# Patient Record
Sex: FEMALE | Race: OTHER
[De-identification: names, ages, dates, MRNs, and addresses within clinical notes are randomized per-mention and may not be internally consistent; named-entity substitution may affect disease eponyms.]

---

## 2020-10-04 ENCOUNTER — HOSPITAL ENCOUNTER (INPATIENT)
Dept: HOSPITAL 72 - EMR | Age: 65
LOS: 2 days | Discharge: HOME | DRG: 206 | End: 2020-10-06
Payer: MEDICARE

## 2020-10-04 VITALS — WEIGHT: 139.11 LBS | BODY MASS INDEX: 25.6 KG/M2 | HEIGHT: 62 IN

## 2020-10-04 VITALS — DIASTOLIC BLOOD PRESSURE: 52 MMHG | SYSTOLIC BLOOD PRESSURE: 108 MMHG

## 2020-10-04 VITALS — SYSTOLIC BLOOD PRESSURE: 115 MMHG | DIASTOLIC BLOOD PRESSURE: 67 MMHG

## 2020-10-04 VITALS — DIASTOLIC BLOOD PRESSURE: 62 MMHG | SYSTOLIC BLOOD PRESSURE: 105 MMHG

## 2020-10-04 VITALS — DIASTOLIC BLOOD PRESSURE: 67 MMHG | SYSTOLIC BLOOD PRESSURE: 140 MMHG

## 2020-10-04 DIAGNOSIS — I10: ICD-10-CM

## 2020-10-04 DIAGNOSIS — R94.31: ICD-10-CM

## 2020-10-04 DIAGNOSIS — E78.5: ICD-10-CM

## 2020-10-04 DIAGNOSIS — I83.90: ICD-10-CM

## 2020-10-04 DIAGNOSIS — M94.0: Primary | ICD-10-CM

## 2020-10-04 LAB
ADD MANUAL DIFF: NO
ALBUMIN SERPL-MCNC: 3.9 G/DL (ref 3.4–5)
ALBUMIN/GLOB SERPL: 1.1 {RATIO} (ref 1–2.7)
ALP SERPL-CCNC: 95 U/L (ref 46–116)
ALT SERPL-CCNC: 52 U/L (ref 12–78)
ANION GAP SERPL CALC-SCNC: 5 MMOL/L (ref 5–15)
APTT BLD: 28 SEC (ref 23–33)
AST SERPL-CCNC: 35 U/L (ref 15–37)
BASOPHILS NFR BLD AUTO: 1.7 % (ref 0–2)
BILIRUB SERPL-MCNC: 0.3 MG/DL (ref 0.2–1)
BUN SERPL-MCNC: 11 MG/DL (ref 7–18)
CALCIUM SERPL-MCNC: 8.9 MG/DL (ref 8.5–10.1)
CHLORIDE SERPL-SCNC: 109 MMOL/L (ref 98–107)
CHOLEST SERPL-MCNC: 192 MG/DL (ref ?–200)
CO2 SERPL-SCNC: 29 MMOL/L (ref 21–32)
CREAT SERPL-MCNC: 0.8 MG/DL (ref 0.55–1.3)
EOSINOPHIL NFR BLD AUTO: 2.5 % (ref 0–3)
ERYTHROCYTE [DISTWIDTH] IN BLOOD BY AUTOMATED COUNT: 12.3 % (ref 11.6–14.8)
GLOBULIN SER-MCNC: 3.5 G/DL
HCT VFR BLD CALC: 41.1 % (ref 37–47)
HDLC SERPL-MCNC: 55 MG/DL (ref 40–60)
HGB BLD-MCNC: 13.9 G/DL (ref 12–16)
INR PPP: 1 (ref 0.9–1.1)
LYMPHOCYTES NFR BLD AUTO: 45 % (ref 20–45)
MCV RBC AUTO: 90 FL (ref 80–99)
MONOCYTES NFR BLD AUTO: 9.2 % (ref 1–10)
NEUTROPHILS NFR BLD AUTO: 41.8 % (ref 45–75)
PLATELET # BLD: 230 K/UL (ref 150–450)
POTASSIUM SERPL-SCNC: 3.5 MMOL/L (ref 3.5–5.1)
RBC # BLD AUTO: 4.57 M/UL (ref 4.2–5.4)
SODIUM SERPL-SCNC: 143 MMOL/L (ref 136–145)
TRIGL SERPL-MCNC: 87 MG/DL (ref 30–150)
WBC # BLD AUTO: 7.7 K/UL (ref 4.8–10.8)

## 2020-10-04 PROCEDURE — 93017 CV STRESS TEST TRACING ONLY: CPT

## 2020-10-04 PROCEDURE — 93970 EXTREMITY STUDY: CPT

## 2020-10-04 PROCEDURE — 85730 THROMBOPLASTIN TIME PARTIAL: CPT

## 2020-10-04 PROCEDURE — 84484 ASSAY OF TROPONIN QUANT: CPT

## 2020-10-04 PROCEDURE — 93306 TTE W/DOPPLER COMPLETE: CPT

## 2020-10-04 PROCEDURE — 36415 COLL VENOUS BLD VENIPUNCTURE: CPT

## 2020-10-04 PROCEDURE — 85025 COMPLETE CBC W/AUTO DIFF WBC: CPT

## 2020-10-04 PROCEDURE — 80061 LIPID PANEL: CPT

## 2020-10-04 PROCEDURE — 85610 PROTHROMBIN TIME: CPT

## 2020-10-04 PROCEDURE — 83036 HEMOGLOBIN GLYCOSYLATED A1C: CPT

## 2020-10-04 PROCEDURE — 83880 ASSAY OF NATRIURETIC PEPTIDE: CPT

## 2020-10-04 PROCEDURE — 99285 EMERGENCY DEPT VISIT HI MDM: CPT

## 2020-10-04 PROCEDURE — 71045 X-RAY EXAM CHEST 1 VIEW: CPT

## 2020-10-04 PROCEDURE — 84443 ASSAY THYROID STIM HORMONE: CPT

## 2020-10-04 PROCEDURE — 93005 ELECTROCARDIOGRAM TRACING: CPT

## 2020-10-04 PROCEDURE — 78452 HT MUSCLE IMAGE SPECT MULT: CPT

## 2020-10-04 PROCEDURE — 80053 COMPREHEN METABOLIC PANEL: CPT

## 2020-10-04 PROCEDURE — 83735 ASSAY OF MAGNESIUM: CPT

## 2020-10-04 PROCEDURE — 85379 FIBRIN DEGRADATION QUANT: CPT

## 2020-10-04 PROCEDURE — 83690 ASSAY OF LIPASE: CPT

## 2020-10-04 RX ADMIN — LISINOPRIL SCH MG: 10 TABLET ORAL at 19:30

## 2020-10-04 NOTE — NUR
NURSE NOTES:

Patient received from SIDNEY Lundberg. Patient is awake, alert and oriented x 4. Patient is 
talkative, only speaks Japanese. Patient is in room air satting at 98%. Patient has a 20 
gauge IV on his right AC. No complaints at this moment. Bed is in the lowest position, call 
light within reach. Will continue to monitor.

## 2020-10-04 NOTE — NUR
NURSE NOTES:

Patient experienced an episode of low /52. Contacted Dr. Manuel Trejo. Instructed to 
hold lisinopril 10 mg per Dr. Manuel Trejo.

## 2020-10-04 NOTE — NUR
NURSE NOTES:

Pt arrived in the unit. Received report from VANESSA Cuba RN. Pt alert, A/O x4, Scottish 
speaking but understands English, able to make needs known. Pt has steady gait. Pt 
complaining of 8/10 pain on L chest that radiates to L shoulder, nitro patch still intact 
from ED. VS obtained and WNL (/67, RR 16, O2 sat in RA 98%, HR 60). Admission 
assessment performed, skin intact. IV site patent and asymptomatic. Bed on lowest position, 
call light within reach. Med brought by pt, will bring to pharmacy. Pt refused to re-count 
her money and refused to keep in safe. Will contact MD for admission orders.

## 2020-10-04 NOTE — EMERGENCY ROOM REPORT
History of Present Illness


General


Chief Complaint:  Chest Pain


Source:  Patient





Present Illness


HPI


65-year-old female with past medical history of hypertension, dyslipidemia 

presents with substernal left-sided chest pressure that has been constant since 

yesterday.  She called her primary care doctor Dr. Wagner who advised her to come 

to the ER for further evaluation.


She denies history of angiogram, echocardiogram or recent cardiac evaluation.  

She did not take aspirin prior to arrival.  She denies any shortness of breath, 

dyspnea on exertion, peripheral edema, cough, fever, hemoptysis, nausea, 

vomiting, diarrhea, hematuria, back pain or other symptoms.


The patient's symptoms were gradual onset, severity was moderate, duration since

1 day.  


Quality: Pressure





Past medical history: hypertension, dyslipidemia


Past surgical history: Hysterectomy





Smoking:  Denies


Alcohol use:  Denies


Drug use:  Denies





Review of systems:


CONST: No fevers or chills, No night sweats


PULMONARY: No productive cough,  No shortness of breath 


CARDIAC: No chest pain, No palpitations 


GI: No vomiting, No diarrhea , No melena_or_BRBPR 


: No dysuria, No hematuria, No discharge 


NEURO: No new_focal_weakness_or_numbness, No confusion, No vision changes


14 point Review of Systems is otherwise negative except per HPI





Physical Exam:


GENERAL: Awake_alert_ nontoxic, no acute distress Spo2 100% on RA -normal


EYES: Extraocular muscles are intact. Conjunctivae clear. Lids without swelling


ENT: External nose and ear normal_in_appearance. Oropharynx clear. 

Head_atraumatic, Moist_oral_mucosa


NECK:  No JVD. No meningismus. No thyromegaly.  Supple. Trachea midline


RESP: Normal respiratory effort. Symmetric rise. No stridor. 

Clear_to_auscultation_No_rales_No_wheezes


CARDIAC: Regular rate and regular rhytm. No_significant pedal edema.


ABDOMEN: Soft. Nondistended.  Nontender_No_rebound_or_guarding.


MSK:  Normal muscle tone, without rigidity.  Extremities without asymmetric 

deformity or swelling.  


SKIN: Warm and dry.  No visible cyanosis or pallor


NEUROLOGIC: Alert, oriented x3.  Motor_and_sensation_grossly_intact. No truncal 

ataxia. Gait_normal


Psych: Normal mood and affect, normal judgment and insight











- COORDINATION OF CARE


Case was discussed with: Patient  , Patient's Physician





Any labs and imaging that were ordered were interpreted as part of the medical 

decision making:








Medical Decision Making/Plan:





Differential diagnosis includes acute myocardial infarction, acute coronary 

syndrome and unstable angina, pulmonary embolism, pneumothorax, pneumonia, and 

aortic dissection, among others.  





Patient is currently well appearing with stable vitals.   


EKG shows biphasic T wave inversions in lead V2, V3, and T wave inversions in 

the lateral leads.  No STEMI.


Chest xray shows cardiomegaly with no pleural effusions or interstitial edema.  

No evidence of decompensated CHF. No evidence of pneumothorax, pneumonia, or 

significant pleural effusion.


Troponin is negative x1.





Aspirin given.  However, given that chest pain is currently resolved, risks 

likely outweigh benefits of IV heparin at this time, so deferred.





The pain is not classic for pericarditis or myocarditis, and the patient has no 

significant risk factors for a pericardial effusion and has stable vitals signs,

unlikely to have tamponade.  Pain is not likely to be  pulmonary embolism, 

patient has  no significant PE risk factors.   The presentation is not 

consistent with dissection, pain is not severe, radiating to back, or tearing in

nature.  Has normal bilateral radial and pedal pulses.   However given patients

presentation and risk factors, patient will be admitted for serial troponins and

risk stratification and evaluation for likely stress testing.





This patient appears to be a suitable candidate for transfer to telemetry floor 

at this time with orders from the admitting physician who is aware of the 

patient's evaluation, ancillary test findings, and current condition, and agrees

with treatment and disposition.  





I spoke with Dr. Urias, and reviewed the patients presentation, workup, 

results, and treatment.  


They will admit the patient for further care and evaluation, and assume care of 

the patient at this time.


Allergies:  


Coded Allergies:  


     No Known Allergies (Unverified , 4/15/14)





COVID-19 Screening


Contact w/high risk pt:  No


Experienced COVID-19 symptoms?:  No


COVID-19 Testing performed PTA:  No





Nursing Documentation-[facility]


Past Medical History:  No History, Except For


Hx Hypertension:  Yes





Physical Exam





Vital Signs








  Date Time  Temp Pulse Resp B/P (MAP) Pulse Ox O2 Delivery O2 Flow Rate FiO2


 


10/4/20 14:50 98.4 66 18 152/76 (101) 97 Room Air  








Sp02 EP Interpretation:  reviewed, normal





Medical Decision Making


Diagnostic Impression:  


   Primary Impression:  


   Chest pain


   Additional Impressions:  


   Abnormal EKG


   HTN (hypertension)


   HLD (hyperlipidemia)





EKG Diagnostic Results


PA Scribe Jenni


12-lead EKG (interpreted by me) 


Time: 1454


Indication: Rhythm analysis


Tracing visualized and Interpreted by me. Rhythm:  Normal sinus rhythm 


Rate:  63 bpm


QTc:  425


Morphology: No_significant_ST_elevations_or_depressions, No STEMI


Impression: Biphasic T wave inversions in lead V2 and V3.  T wave inversions in 

lateral leads.  No acute STEMI





Rhythm Strip Diag. Results


Rhythm Strip Time:  15:18


EP Interpretation:  yes


Rate:  65


Rhythm:  NSR, no PVC's, no ectopy





Chest X-Ray Diagnostic Results


Chest X-Ray Diagnostic Results :  


   LETI Ronibe Jenni


Chest X-Ray: 


Views: 1 view(s)


Indication:  Chest pain


Findings: Normal heart size.  Mediastinum normal.  No infiltrate.


Impression: No acute disease


The X-ray(s) were independently viewed and interpreted contemporaneously


Electronically signed by Marlen tadeo DO


Reevaluation Time:  16:16





Last Vital Signs








  Date Time  Temp Pulse Resp B/P (MAP) Pulse Ox O2 Delivery O2 Flow Rate FiO2


 


10/4/20 15:13    152/76    


 


10/4/20 14:50 98.4 66 18  97 Room Air  








Status:  improved


Disposition:  ADMITTED AS INPATIENT


Admit Decision Time:  15:18


Condition:  Stable











Marlen Franklin D.O.            Oct 4, 2020 15:18

## 2020-10-04 NOTE — NUR
NURSE HAND-OFF REPORT: 



Important Events on Shift: New admit

Patient Status: 

Diet: 



Pending Orders: 

Pending Results/Labs:

Pending MD notification:



Latest Vital Signs: Temperature 96.4 , Pulse 60 , B/P 115 /67 , Respiratory Rate 16 , O2 SAT 
98 , Room Air, O2 Flow Rate .  

Vital Sign Comment: 



EKG Rhythm: Sinus Rhythm

Rhythm change?: 

MD Notified?: -

MD Response: 



Latest Suh Fall Score: 20  

Fall Risk: Low Risk 

Safety Measures: Call light Within Reach, Bed Alarm Zone 1, Side Rails Side Rails x2, Bed 
position Low and Locked.

Fall Precautions: 

Patient Fall Education



Report given to SIDNEY Arriaga

## 2020-10-04 NOTE — NUR
ED Nurse Note:



Patient is resting on gurney with no distress at this time. Calm and speaks in 
full sentences.

## 2020-10-04 NOTE — DIAGNOSTIC IMAGING REPORT
FILM CXR 1 VIEW

 

INDICATION: Pain

 

COMPARISON: April 15, 2014

 

FINDINGS: Single frontal view demonstrates prominent heart size and 

pulmonary vascular congestion.  The lungs are clear.  No pleural 

effusions.  The visualized osseous structures are within normal limits.

 

IMPRESSION: Enlarged heart and mild pulmonary congestion without focal 

pneumonia.

## 2020-10-05 VITALS — DIASTOLIC BLOOD PRESSURE: 70 MMHG | SYSTOLIC BLOOD PRESSURE: 112 MMHG

## 2020-10-05 VITALS — SYSTOLIC BLOOD PRESSURE: 126 MMHG | DIASTOLIC BLOOD PRESSURE: 70 MMHG

## 2020-10-05 VITALS — SYSTOLIC BLOOD PRESSURE: 110 MMHG | DIASTOLIC BLOOD PRESSURE: 60 MMHG

## 2020-10-05 VITALS — DIASTOLIC BLOOD PRESSURE: 71 MMHG | SYSTOLIC BLOOD PRESSURE: 136 MMHG

## 2020-10-05 VITALS — DIASTOLIC BLOOD PRESSURE: 57 MMHG | SYSTOLIC BLOOD PRESSURE: 106 MMHG

## 2020-10-05 RX ADMIN — NITROGLYCERIN PRN MG: 0.4 TABLET SUBLINGUAL at 08:14

## 2020-10-05 RX ADMIN — NITROGLYCERIN PRN MG: 0.4 TABLET SUBLINGUAL at 08:08

## 2020-10-05 RX ADMIN — LISINOPRIL SCH MG: 10 TABLET ORAL at 09:00

## 2020-10-05 RX ADMIN — Medication SCH MG: at 09:22

## 2020-10-05 NOTE — CARDIOLOGY PROGRESS NOTE
Assessment/Plan


Assessment/Plan


ms pain reproducible on palpation 


abn ekg 


htn 


hyperlipidmia 








all trop are neg and pain reproduced by palptation 


however ekg abn , echo normal wall motion 





[id number]





Objective





Last 24 Hour Vital Signs








  Date Time  Temp Pulse Resp B/P (MAP) Pulse Ox O2 Delivery O2 Flow Rate FiO2


 


10/5/20 16:00 98.6 66 16 110/60 (77) 97   


 


10/5/20 16:00  57      


 


10/5/20 12:00  60      


 


10/5/20 12:00 98.1 57 16 110/60 (77) 97   


 


10/5/20 09:00      Room Air  


 


10/5/20 09:00    115/62    


 


10/5/20 08:14    126/70    


 


10/5/20 08:08    126/70    


 


10/5/20 08:00  73      


 


10/5/20 08:00 98.4 66 17 126/70 (88) 96   


 


10/5/20 04:00  60      


 


10/5/20 04:00 97.9 62 16 136/71 (92) 98   


 


10/5/20 00:05 97.9 63 16 106/57 (73) 97   


 


10/5/20 00:00  56      


 


10/4/20 21:00      Room Air  

















Intake and Output  


 


 10/4/20 10/5/20





 19:00 07:00


 


Intake Total 0 ml 350 ml


 


Balance 0 ml 350 ml


 


  


 


Intake Oral 0 ml 350 ml


 


# Voids  3











Laboratory Tests








Test


 10/5/20


02:53 10/5/20


09:35 10/5/20


18:00


 


Hemoglobin A1c


 6.1 %


(4.3-6.0)  H 


 





 


Magnesium Level


 2.4 MG/DL


(1.8-2.4) 


 





 


Troponin I


 0.000 ng/mL


(0.000-0.056) 0.000 ng/mL


(0.000-0.056) 0.000 ng/mL


(0.000-0.056)


 


Thyroid Stimulating Hormone


(TSH) 9.974 uiU/mL


(0.358-3.740) 


 




















Devin Nguyen MD           Oct 5, 2020 20:10

## 2020-10-05 NOTE — NUR
NURSE NOTES:

At 8:10 pt complained of 8/10 chest pain radiating to left should. /70. Gave nitro SL 
.4mg. after 5 minutes pain at 7/10 same radiation pattern. Gave second dose. BP dropped to 
102/62. o2 at 2 liters NC applied. spoke to Dr. Trejo who said to contact Dr. Nguyen. 
Per Dr. Wilder do Stat EKG. After reviewing ekg results he ordered venous duplex and 2d 
echo. All orders place. Current pain is 3/10 in the chest with radiation to left shoulder. 
States that she experienced epigastric pain as well a few moments ago but now it is gone.

## 2020-10-05 NOTE — NUR
NURSE NOTES:

Reported to Dr. Nguyen the results od 2d echo, negative venous duplex, chest pain of 3/10 
radiating to left shoulder. Waiting for response

## 2020-10-05 NOTE — NUR
NURSE NOTES:

Received report from SIDNEY Abbott pt in bed, awake, alert, oriented X4. Able to make needs known. 
No resp distress noted. Cardiac monitor in place. Right AC 20G saline locked, no s/s 
infiltration noted. Amb with slow steady gait. Bed in low position & locked, side rails up 
x2. Call light with in reach. bed alarm on. Explained to pt to use call light when 
assistance is needed.

## 2020-10-05 NOTE — HISTORY & PHYSICAL
History and Physical


History & Physicial


HP dictated # [id number]











Manuel Trejo MD                Oct 5, 2020 09:48

## 2020-10-05 NOTE — NUR
NURSE NOTES:

Pt in bed eating breakfast. No complaint of pain or distress. bed low and locked. call light 
within reach. Whiteboard updated.

-------------------------------------------------------------------------------

Addendum: 10/05/20 at 0729 by Milena Mcgraw RN

-------------------------------------------------------------------------------

Pt received from SIDNEY Arriaga.

## 2020-10-05 NOTE — HISTORY AND PHYSICAL REPORT
DATE OF ADMISSION:  10/04/2020

CHIEF COMPLAINT:  Chest pain.



HISTORY OF PRESENT ILLNESS:  This is a 65-year-old  female who

presented to the emergency room with chest pressure which started the day

prior to admission.  The patient now states that when she takes a deep

breath, the pain is worse.  The patient was admitted for further

evaluation and treatment.



PAST MEDICAL HISTORY:  The patient denies history of diabetes, although it

is mentioned in the ER note, she does have history of hypertension and

hyperlipidemia.



PAST SURGICAL HISTORY:  Hysterectomy.



MEDICATIONS:  Reviewed in the EMR.



SOCIAL HISTORY:  No history of smoking or alcohol abuse.  The patient lives

with her children.



ALLERGIES:  No known drug allergies.



REVIEW OF SYSTEMS:  Noncontributory except above.



PHYSICAL EXAMINATION:

GENERAL:  The patient is a 65-year-old female in no acute distress.

 

VITAL SIGNS:  Blood pressure 115/62, pulse 66, temperature 98.7,

respiratory rate 17.

HEENT:  Pink conjunctivae.  Anicteric sclerae.

NECK:  Supple.

LUNGS:  Clear to auscultation.

CHEST:  The patient has some tenderness.

HEART:  S1, S2 without murmurs or rubs.

ABDOMEN:  Soft, nontender.

EXTREMITIES:  No cyanosis or edema.



LABORATORY FINDINGS:  CBC shows a WBC of 7700, hematocrit 41.1, hemoglobin

is 13.9, platelets 230,000.  Chemistry panel shows a serum sodium 143,

potassium 3.5, chloride 109, BUN is 11, creatinine 0.8.  TSH is 9.9.  EKG

could not be found in the chart today; however, the tracing does not show

any acute changes.



ASSESSMENT:  This is a 65-year-old female who was admitted with chest pain,

although the patient says it is pressure like; however, now she said that

it is worse with respirations so it appears that it is pleuritic chest

pain and atypical.  Her risk factors are hypertension and

hyperlipidemia.



PLAN:  The patient will have troponins checked to rule out myocardial

infarction.  Cardiology consultation will be obtained.  The patient will

likely need a stress test prior to discharge after she is ruled out.

Lipid panel will be checked.  Medication will be adjusted.  TSH is

somewhat on the high side; however, she is on levothyroxine, which we will

continue and I will not increase the dose at this time.  The patient was

started on aspirin.









  ______________________________________________

  Manuel Trejo M.D.





DR:  Nate

D:  10/05/2020 09:50

T:  10/05/2020 10:27

JOB#:  [id number]

CC:

## 2020-10-05 NOTE — DIAGNOSTIC IMAGING REPORT
EXAM: ULTRASOUND Venous Duplex Scan Nick Leg

 

CLINICAL HISTORY: Leg pain and edema.

 

COMPARISON:  None

 

TECHNIQUE:  Doppler examination include grayscale images obtained with and without

compression, and color and spectral doppler analysis.

 

FINDINGS:  

 

Doppler examination shows normal spontaneity, phasicity, compressibility in the

bilateral lower extremities. There is no thrombus identified by grayscale. Normal

color and spectral flow is identified. There is no evidence of valvular incompetency

or insufficiency.

 

 

IMPRESSION:

 

UNREMARKABLE VENOUS DUPLEX.

## 2020-10-05 NOTE — NUR
NURSE HAND-OFF REPORT: 



Important Events on Shift:[chest pain not completeley relived by niotro. Nitro dropped her 
bp. stat ekg, troponin, and venous duplex done]

Patient Status: [in bed alert and talkative.]

Diet: [no salt added]



Pending Orders: [na]

Pending Results/Labs:[na]

Pending MD notification:[na]



Latest Vital Signs: Temperature 98.6 , Pulse 57 , B/P 110 /60 , Respiratory Rate 16 , O2 SAT 
97 , Room Air, O2 Flow Rate .  

Vital Sign Comment: [stable]



EKG Rhythm: Sinus Bradycardia

Rhythm change?: Y 

MD Notified?: N -

MD Response: 



Latest Suh Fall Score: 20  

Fall Risk: Low Risk 

Safety Measures: Call light Within Reach, Bed Alarm Zone 1, Side Rails Side Rails x2, Bed 
position Low and Locked.

Fall Precautions: 

Patient Fall Education



Report given to [Coreen LITTLE].

## 2020-10-05 NOTE — NUR
CASE MANAGEMENT: REVIEW



65 YEAR OLD FEMALE PRESENTED TO ED FROM HOME 



CC: CHEST PAIN 







SI: ACS . HTN

T 98.4 HR 53 RR 14 /76 SAT 97% ROOM AIR

LDL CHOLESTEROL 119 TROP I 0.008 







IS: ASA 325MG PO X1

NITROGLYCERIN 1 INCH TOPICAL X1 



CARDIO CONSULTED

2D ECHO PENDING 

VENOUS DUPLEX SCAN BLE PENDING 





***TELEMETRY UNIT STATUS***

DCP: PATIENT IS FROM HOME

## 2020-10-05 NOTE — NUR
NURSE HAND-OFF REPORT: 



Important Events on Shift:[Hold lisinopril for SBP less than 120 per Dr. Manuel Trejo]

Patient Status: []

Diet: [No added salt diet]



Pending Orders: []

Pending Results/Labs:[]

Pending MD notification:[]



Latest Vital Signs: Temperature 97.9 , Pulse 60 , B/P 136 /71 , Respiratory Rate 16 , O2 SAT 
98 , Room Air, O2 Flow Rate .  

Vital Sign Comment: []



EKG Rhythm: Sinus Rhythm

Rhythm change?: Y 

MD Notified?: N -

MD Response: 



Latest Suh Fall Score: 20  

Fall Risk: Low Risk 

Safety Measures: Call light Within Reach, Bed Alarm Zone 1, Side Rails Side Rails x2, Bed 
position Low and Locked.

Fall Precautions: 

Patient Fall Education



Report given to [SIDNEY Abbott].

## 2020-10-06 VITALS — DIASTOLIC BLOOD PRESSURE: 72 MMHG | SYSTOLIC BLOOD PRESSURE: 132 MMHG

## 2020-10-06 VITALS — DIASTOLIC BLOOD PRESSURE: 76 MMHG | SYSTOLIC BLOOD PRESSURE: 150 MMHG

## 2020-10-06 VITALS — DIASTOLIC BLOOD PRESSURE: 67 MMHG | SYSTOLIC BLOOD PRESSURE: 131 MMHG

## 2020-10-06 VITALS — DIASTOLIC BLOOD PRESSURE: 72 MMHG | SYSTOLIC BLOOD PRESSURE: 143 MMHG

## 2020-10-06 VITALS — DIASTOLIC BLOOD PRESSURE: 75 MMHG | SYSTOLIC BLOOD PRESSURE: 116 MMHG

## 2020-10-06 VITALS — DIASTOLIC BLOOD PRESSURE: 71 MMHG | SYSTOLIC BLOOD PRESSURE: 137 MMHG

## 2020-10-06 RX ADMIN — Medication SCH MG: at 09:28

## 2020-10-06 RX ADMIN — LISINOPRIL SCH MG: 10 TABLET ORAL at 16:47

## 2020-10-06 NOTE — NUR
NURSE NOTES:

Pt discharged home.. Wrist band removed. Iv removed. Paperwork signed, belongings signed 
for. Medication picked up from pharmacy and signed for. Tele monitor removed. Pt stable and 
understands that she must call Dr. Gissell stoddard for appointment on Monday the 12th. 
Medication reviewed.

## 2020-10-06 NOTE — NUR
NURSE NOTES:

Pt received from Coreen LITTLE. pt in bed awake and alert no complaint of chest pain at this 
time. SHe is NPO and understadn she will be having a lexiscan later today. Bed low not 
locked as she is ambulatory and stable. call light within reach, she complain of some pain 
in her right big toe where nail is digging into skin. Will ask MD for wound consult.

## 2020-10-06 NOTE — NUR
NURSE NOTES: PATIENT DISCHARGED HOME VIA FAMILY VEHICLE, IV DISCONTINUE BY AM NURSE. NO 
SIGNS AND SYMPTOMS OF ACUTE DISTRESS. VITALS STABLE, AFEBRILE.

## 2020-10-06 NOTE — GENERAL PROGRESS NOTE
Subjective


Allergies:  


Coded Allergies:  


     No Known Allergies (Unverified , 4/15/14)


Subjective


still with CP





Objective





Last 24 Hour Vital Signs








  Date Time  Temp Pulse Resp B/P (MAP) Pulse Ox O2 Delivery O2 Flow Rate FiO2


 


10/6/20 12:00  53      


 


10/6/20 12:00 99.0 55 20 150/76 (100) 99   


 


10/6/20 09:00      Room Air  


 


10/6/20 08:00  54      


 


10/6/20 08:00 97.5 54 18 137/71 (93) 99   


 


10/6/20 04:00 97.9 62 20 132/72 (92) 99   


 


10/6/20 04:00  53      


 


10/6/20 00:00 98.7 74 18 116/75 (89) 97   


 


10/6/20 00:00  56      


 


10/5/20 21:00      Room Air  


 


10/5/20 20:00 98.0 70 18 112/70 (84) 98   


 


10/5/20 20:00  70      


 


10/5/20 16:00 98.6 66 16 110/60 (77) 97   


 


10/5/20 16:00  57      

















Intake and Output  


 


 10/5/20 10/6/20





 19:00 07:00


 


Intake Total  200 ml


 


Balance  200 ml


 


  


 


Intake Oral  200 ml


 


# Voids  3








Laboratory Tests


10/5/20 18:00: Troponin I 0.000


10/6/20 05:44: D-Dimer 0.45


Height (Feet):  5


Height (Inches):  2.00


Weight (Pounds):  141


Cardiovascular:  normal rate


Respiratory/Chest:  lungs clear


Edema:  no edema noted Generalized





Assessment/Plan


Problem List:  


(1) ACS (acute coronary syndrome)


ICD Codes:  I24.9 - Acute ischemic heart disease, unspecified


SNOMED:  296021768


(2) HTN (hypertension)


ICD Codes:  I10 - Essential (primary) hypertension


SNOMED:  57759263


(3) HLD (hyperlipidemia)


ICD Codes:  E78.5 - Hyperlipidemia, unspecified


SNOMED:  06854475


(4) Abnormal EKG


ICD Codes:  R94.31 - Abnormal electrocardiogram [ECG] [EKG]


SNOMED:  896791820


(5) Chest pain


ICD Codes:  R07.9 - Chest pain, unspecified


SNOMED:  85108563


Assessment/Plan:


stress test today


discussed with Manuel Knight MD                Oct 6, 2020 14:24

## 2020-10-06 NOTE — DIAGNOSTIC IMAGING REPORT
Indications: Chest pain

 

Technique: Single day single isotope protocol utilized. Initially, resting images

obtained using IV administration 10.9 millicuries 99M technetium Myoview.

Subsequently, patient underwent  lexiscan stress testing. See cardiology report for

details. During Lexiscan infusion, IV administration 30.2 mCi 99 M technetium

Myoview. SPECT and planar images obtained. SPECT images gated to 8 phases of the

cardiac cycle were also obtained, and reformatted into cine images for evaluation of

ejection fraction.

 

Comparison: none

 

Findings: Per cardiology report, patient experienced nausea during infusion. Per

cardiology report, resting EKG demonstrates normal sinus rhythm with two wave

abnormality in leads V1 and V2. No significant ST changes during infusion.  Imaging

demonstrates normal poststress perfusion, no fixed nor reversible perfusion defects

demonstrated. Normal left ventricular chamber size.. Calculated post stress ejection

fraction 67%. No focal wall motion abnormality

 

Impression: Nonischemic clinical response to pharmacologic stress, per cardiology

report

 

Nonischemic electrocardiographic response to pharmacologic stress, per cardiology

report

 

No imaging findings to suggest ischemia, at level of stress achieved.

 

Calculated post stress ejection fraction 67%

## 2020-10-06 NOTE — NUR
CASE MANAGEMENT: REVIEW





SI: ACS . HTN

T 97.5 HR 53 RR 18 /76 SAT 97% ROOM AIR









IS: LEXISCAN IV X1

MORPHINE IV Q3HR PRN 



STRESS TEST 





***TELEMETRY UNIT STATUS***

DCP: PATIENT IS FROM HOME

## 2020-10-06 NOTE — CARDIOLOGY REPORT
--------------- APPROVED REPORT --------------





EKG Measurement

Heart [id number]

AR [id number]

[id number]

[id number]

[id number]



<Conclusion>

Normal sinus rhythm

T wave abnormality, consider anterior ischemia

Abnormal ECG

## 2020-10-06 NOTE — CONSULTATION
DATE OF CONSULTATION:  10/05/2020

CARDIOLOGY CONSULTATION



CONSULTING PHYSICIAN:  Devin Nguyen MD.



REFERRING PHYSICIAN:  Manuel Trejo MD.



REASON FOR REFERRAL:  Chest pain.



HISTORY OF PRESENT ILLNESS:  This is a 65-year-old female with history of

hypertension and hyperlipidemia, who presented to the hospital because of

pain that started approximately 3 days ago.  She went to bed after eating

dinner.  In the middle of night, she woke up to go the bathroom and

noticed the pain in the left side.  The pain has been persistent since

then and gets worse when she moves or twists, turns, or when she takes a

deep breath or coughing as well.  She has no shortness of breath when she

walks and she denies pain getting worse when she actually walks to the

bathroom here.  She has 1 pillow usage.  There is no PND.  No orthopnea

and no palpitation.  No dizziness on standing.



PAST MEDICAL HISTORY:  Positive for high blood pressure and high

cholesterol.  No history of heart attack.  No cancer.  No stroke.  No

hepatitis or tuberculosis.  No asthma or emphysema.  No ulcers.  No kidney

problems.  She has had some kind of liver problems that occurred many

years ago for which she was treated at different hospitals, but she does

not know what that was.  She does have few thyroid problems and no HIV,

AIDS, or blood clots.  She does have varicose veins in her left leg she

says.



ALLERGIES:  She is not allergic to any medications.



SOCIAL HISTORY:  Never smoked or drank or used drugs.



REVIEW OF SYSTEMS:  GASTROINTESTINAL:  She has no nausea or vomiting.  She

does have some epigastric pain.  No diarrhea.  No constipation.  No black

or bloody stools.  GENITOURINARY:  Denies any problem with urination.

PULMONARY:  Denies any coughing or wheezing.  CONSTITUTIONAL:  Denies.



PHYSICAL EXAMINATION:

GENERAL:  Shows to be a middle-aged female, in no respiratory distress.

NECK:  Supple.  No jugular venous distention.

CHEST:  Chest wall is tender to palpation.  No heaves or thrills noted.

LUNGS:  Clear to auscultation, percussion.

CARDIAC:  Regular rate and rhythm.

ABDOMEN:  Soft, nontender.  Positive bowel sounds.

EXTREMITIES:  There is no clubbing, cyanosis, or edema.

NEUROLOGICAL:  She is awake, alert, and responsive.



LABORATORY VALUES:  Four sets of cardiac enzymes are all negative.  Her

white count 7.7, hemoglobin 13.9, platelet count of 230.  Sodium is 143,

potassium 3.5, chloride 109, bicarb 29, BUN of 11, creatinine 0.8, glucose

of 87.  Liver function tests were normal.  ProBNP was only 66.  Albumin of

3.9.  Cholesterol 192 with LDL of 119 and HDL of 55.  Lipase of 263.

Coags, INR of 1 and PTT of 20.  Her chemistries, her troponin level is

negative on 5 different occasions.  Her telemetry shows sinus.  Her EKG,

however, does show some biphasic T-waves in V1, V2, V3, V4, and V5.  Her

echocardiogram preliminary report was technically difficult, normal left

ventricular systolic function.  No wall motion abnormalities.  Ejection

fraction of 55%.  She did have x-rays done in the emergency room, enlarged

heart, mild pulmonary vascular congestion without focal pneumonia.  She

did have a venous duplex study of the lower extremity that was

unremarkable.



ASSESSMENT AND PLAN:

1. Chest pain suggestive of musculoskeletal pain, reproducible on

palpation.

2. Abnormal EKG.

3. Hypertension.

4. Hyperlipidemia.

5. Varicose veins.



Dr. Trejo, this patient was seen in cardiac consultation.  The

patient's pain is clearly reproducible on palpation of the chest wall.

She has been having this pain for approximately 3 days and all cardiac

enzymes are negative, so I suspect that the pain that she is actually

experiencing is musculoskeletal in origin.  The EKG however is abnormal.

Chronicity of this EKG changes are not known and she deserves further

evaluation as such we will order a myocardial perfusion imaging to be

performed tomorrow to address the EKG abnormalities in light of the fact

that all the cardiac enzymes are negative.









  ______________________________________________

  Devin Nguyen M.D.





DR:  STU

D:  10/05/2020 20:12

T:  10/06/2020 00:22

JOB#:  [id number]

CC:

## 2020-10-06 NOTE — NUR
NURSE NOTES: RECEIVED PATIENT LYING ACROSS BED IN STREET CLOTHES. ALERT/ORIENTED X4, 
VERBALLY RESPONSIVE, DENIES PAIN, IV DISCONTINUED BY AM NURSE.  NO SIGNS AND SYMPTOMS OF 
ACUTE CARDIO RESPIRATORY DISTRESS/SHORTNESS OF BREATH, DENIES CHEST PAIN, NO PERIPHERAL 
EDEMA NOTED. NO COMPLAINTS OF GI DISCOMFORT, NO N/V/D. SIDE RAILS UP X2, BED IN LOWEST 
POSITION FOR SAFETY, BED ALARM ENGAGED FOR SAFETY. DC HOME TONIGHT, FAMILY ETA 1930, PATIENT 
AWARE. NAD. VITALS STABLE, AFEBRILE.

## 2020-10-06 NOTE — CDS PHYSICIAN QUERY
Clarification is required for compliance, coding accuracy, and to reflect 
severity of illness for this patient



Dear Dr. Manuel Trejo                       Date 10/6/2020

/CDS Name Priyanka Gaona         



Clinical Documentation states: Cardiology classes - 65-year-old female with 
history of hypertension and hyperlipidemia, who presented to the hospital 
because of pain that started approximately 3 days ago....1. Chest pain 
suggestive of musculoskeletal pain, reproducible on palpation. 2. Abnormal EKG.



Troponin: 10/4 0.008 ng/mL



Please document the suspected etiology of Chest Pain:



[x] Costochondritis      

[] Acute Coronary Syndrome                   

[] Pericarditis   

[] Anxiety                         

[] Cancer                         

[] Pneumonia                 

[] GERD/Esophagitis             

[] Other:___________________

[] Unable to determine



Present on Admission:  []  Yes          []  No         []  Clinically 
Undetermined



_________________                                    _____________

Physician signature                                       Date



Please also document in your Progress Notes and/or Discharge Summary and 
indicate if the condition was present on admission.

BREE

## 2020-10-06 NOTE — CARDIOLOGY PROGRESS NOTE
Assessment/Plan


Assessment/Plan


1. Chest pain suggestive of musculoskeletal pain, reproducible on palpation.


2. Abnormal EKG.


3. Hypertension.


4. Hyperlipidemia.


5. Varicose veins.





mpi just performed 


no rash to suggest shingles 


all trop neg 


if mpi is neg ok to dc home to 


chest wall remain tender to palpation 


no skeletal abn bassed on cxr





Subjective


Cardiovascular:  Reports: chest pain - still to touch adn with twistitn and 

turning


Respiratory:  Denies: shortness of breath


Genitourinary:  Denies: burning





Objective





Last 24 Hour Vital Signs








  Date Time  Temp Pulse Resp B/P (MAP) Pulse Ox O2 Delivery O2 Flow Rate FiO2


 


10/6/20 12:00  53      


 


10/6/20 12:00 99.0 55 20 150/76 (100) 99   


 


10/6/20 09:00      Room Air  


 


10/6/20 08:00  54      


 


10/6/20 08:00 97.5 54 18 137/71 (93) 99   


 


10/6/20 04:00 97.9 62 20 132/72 (92) 99   


 


10/6/20 04:00  53      


 


10/6/20 00:00 98.7 74 18 116/75 (89) 97   


 


10/6/20 00:00  56      


 


10/5/20 21:00      Room Air  


 


10/5/20 20:00 98.0 70 18 112/70 (84) 98   


 


10/5/20 20:00  70      


 


10/5/20 16:00 98.6 66 16 110/60 (77) 97   


 


10/5/20 16:00  57      








General Appearance:  no apparent distress, alert


Neck:  supple


Cardiovascular:  normal rate, other - chgest wall tenderness to palpation


Respiratory/Chest:  lungs clear


Abdomen:  normal bowel sounds, non tender, soft


Extremities:  no swelling











Intake and Output  


 


 10/5/20 10/6/20





 19:00 07:00


 


Intake Total  200 ml


 


Balance  200 ml


 


  


 


Intake Oral  200 ml


 


# Voids  3











Laboratory Tests








Test


 10/5/20


18:00 10/6/20


05:44


 


Troponin I


 0.000 ng/mL


(0.000-0.056) 





 


D-Dimer


 


 0.45 mg/L FEU


(0.00-0.49)

















Devin Nguyen MD           Oct 6, 2020 14:21

## 2020-10-06 NOTE — NUR
NURSE NOTES:

Per Dr. Nguyen, pt can go home from cardio point of view. Called non urgent line for Dr. Trejo to let him know. left voicemail.

## 2020-10-08 NOTE — DISCHARGE SUMMARY
Discharge Summary


Discharge Summary


_


DATE OF ADMISSION: 10/04/2020


DATE OF DISCHARGE: 10/06/2020





DISCHARGED BY: Dr. Manuel Trejo





CONSULTANTS: 


Dr. Devin Nguyen





[facility] HOSPITAL COURSE:


The patient is a 65-year-old  female who presented to the emergency room

due to chest pressure that started on the day of admission.  She was advised by 

her primary care doctor to go to ED for further evaluation.  She denied any 

shortness of breath, dyspnea on exertion, edema, cough, fever, hemoptysis, 

nausea, vomiting, diarrhea, hematuria, back pain or other symptoms.  She has 

underlying history of hypertension and hyperlipidemia.





Upon evaluation at ED, blood pressure was 152/76, heart rate 66.  EKG showed 

biphasic T wave inversions in lead V2, V3 and T wave inversion in the lateral 

leads.  Chest x-ray showed cardiomegaly.  No evidence of pneumothorax, pneumonia

or pleural effusion.  Troponin was negative.  She was given aspirin.  She was 

then admitted for evaluation of chest pain.





She underwent cardiac evaluation.  Cardiac enzymes were monitored.  She was 

given antiplatelet therapy.  She was given Pepcid.  She was placed on lisinopril

10 mg daily.  Telemetry showed sinus rhythm.  Per cardiologist evaluation, EKG 

showed biphasic T waves in V1, V2, V3, V4 and V5.  Echocardiogram showed 

ejection fraction of 55%.  No wall motion abnormalities.  Patient had 

reproducible pain on palpation of the chest wall.  Cardiac enzymes were 

negative.





TSH was elevated to 9.  She was given levothyroxine 88 mcg daily.





Venous duplex scan was negative.  She underwent Lexiscan stress test.  There was

no imaging findings to suggest ischemia.





Patient was then discharged home.





FINAL DIAGNOSES: 


Chest pain due to costochondritis


Hypertension


Hyperlipidemia


Abnormal EKG


Hypothyroidism





DISPOSITION: Patient was discharged home.





DISCHARGE MEDICATIONS: Refer to Discharge Medication List.





DISCHARGE INSTRUCTIONS: Follow-up in a week.








I have been assigned to complete a discharge summary on this account, I was not 

involved with the patient's management.--FRANK Moody Jacqueline Robles NP    Oct 8, 2020 19:59

## 2020-10-09 NOTE — CARDIOLOGY REPORT
--------------- APPROVED REPORT --------------





EXAM: Two-dimensional and M-mode echocardiogram with Doppler and color Doppler.



INDICATION

Chest Pain 



M-Mode DIMENSIONS 

IVSd0.9 (0.7-1.1cm)Left Atrium (MM)3.5 (1.6-4.0cm)

LVDd5.0 (3.5-5.6cm)Aortic Root3.2 (2.0-3.7cm)

PWd0.9 (0.7-1.1cm)Aortic Cusp Exc.2.0 (1.5-2.0cm)

IVSs1.3 cmEPSS0.5 (>1.0cm)

LVDs3.4 (2.5-4.0cm)

PWs1.7 cm



<Conclusion>

Technically difficult study due to poor parasternal acoustical windows.

Normal left ventricular chamber size, systolic function and wall motion to extent visualized.

Left ventricular ejection fraction estimated to be 55 %.

Anterior Echo-free space, may be due to pericardial fat or effusion.

All other cardiac chamber sizes are within normal limits. 

Focal aortic valve sclerosis with adequate cusp excursion.

Thickened mitral valve leaflets with normal excursion.

Mitral annulus and aortic root calcification.

Pulmonic valve not visualized.

Normal tricuspid valve structure. 

IVC at normal size with physiologic collapse.



A  color flow and spectral Doppler study was performed and revealed:

No aortic regurgitation.

Mild mitral regurgitation.

Mitral diastolic velocities suggest reduced left ventricular relaxation c/w mild LV diastolic 

dysfunction (Grade I ). 

 Mild tricuspid regurgitation.

Tricuspid  systolic velocities suggests peak right ventricular systolic pressure of  26 mmHg.

## 2020-10-09 NOTE — CARDIOLOGY REPORT
--------------- APPROVED REPORT --------------





EKG Measurement

Heart [id number]

SC [id number]

[id number]

[id number]

[id number]



<Conclusion>

Normal sinus rhythm

T wave abnormality, consider anterolateral ischemia

Abnormal ECG

## 2022-06-01 NOTE — NUR
ED Nurse Note:



Patient from home and walked in due to left side CP that radiates to her left 
shoulder started yesterday. Denies SOB or vomiting. No reports fo 
lightheadedness. AAO x,4 ambulates with steady agit with non labored breathing, 
Pt is calm and cooperative. Placed on cardiac monitor. ERMD aware. JUAN LUIS (acute kidney injury)